# Patient Record
Sex: FEMALE | Race: OTHER | HISPANIC OR LATINO | Employment: UNEMPLOYED | URBAN - METROPOLITAN AREA
[De-identification: names, ages, dates, MRNs, and addresses within clinical notes are randomized per-mention and may not be internally consistent; named-entity substitution may affect disease eponyms.]

---

## 2017-02-24 ENCOUNTER — HISTORICAL (OUTPATIENT)
Dept: ADMINISTRATIVE | Facility: HOSPITAL | Age: 37
End: 2017-02-24

## 2017-03-03 ENCOUNTER — HISTORICAL (OUTPATIENT)
Dept: SURGERY | Facility: HOSPITAL | Age: 37
End: 2017-03-03

## 2021-09-09 ENCOUNTER — HISTORICAL (OUTPATIENT)
Dept: ADMINISTRATIVE | Facility: HOSPITAL | Age: 41
End: 2021-09-09

## 2022-04-30 NOTE — ED PROVIDER NOTES
"   Patient:   Luzmaria Hector            MRN: [id number]            FIN: [id number]               Age:   40 years     Sex:  Female     :  1980   Associated Diagnoses:   Pneumonia due to COVID-19 virus   Author:   Álvaro Patel MD      Basic Information   Time seen: Immediately upon arrival.   History source: Patient.   Arrival mode: Private vehicle.   History limitation: None.   Additional information: Chief Complaint from Nursing Triage Note : Chief Complaint   2021 11:17 CDT       Chief Complaint           covid (+) patient that reports to the ed (via ems) c/o body aches, sob, vomiting, and subjective fever (x)8 days. afebrile at this time. sp02=93% on room air. nadn...  .   Provider/Visit info:   Time Seen:  Álvaro Patel MD / 2021 11:22  .   History of Present Illness   The patient presents with multiple musculoskeletal pains and Recently Dx with COVID.  The onset was 1  weeks ago.  The course/duration of symptoms is constant and worsening.  Location: generalized. The character of symptoms is "pain".  The degree of pain is moderate.  The exacerbating factor is none.  The relieving factor is none.  Risk factors consist of COVID infection, unvaccinated.  Prior episodes: rare.  Therapy today: none.  Associated symptoms: shortness of breath, chills, denies chest pain, denies abdominal pain and denies vomiting.        Review of Systems   Constitutional symptoms:  Chills, weakness, fatigue, No fever,    Skin symptoms:  No rash, no petechiae.    Eye symptoms:  No recent vision problems,    ENMT symptoms:  Nasal congestion.   Respiratory symptoms:  Cough, no orthopnea, no hemoptysis.    Cardiovascular symptoms:  No chest pain, no palpitations, no syncope, no diaphoresis, no peripheral edema.    Gastrointestinal symptoms:  No abdominal pain, no nausea, no vomiting, no diarrhea, no constipation, no rectal bleeding.    Genitourinary symptoms:  No dysuria, no " hematuria, no vaginal bleeding, no vaginal discharge.    Musculoskeletal symptoms:  Muscle pain, no back pain, no Joint pain.    Neurologic symptoms:  Weakness, no headache, no dizziness, no altered level of consciousness, no numbness, no tingling.    Hematologic/Lymphatic symptoms:  Negative except as documented in HPI.             Additional review of systems information: All other systems reviewed and otherwise negative.      Health Status   Allergies:    Allergic Reactions (Selected)  No Known Allergies  No Known Medication Allergies,    Allergies (2) Active Reaction  No Known Allergies None Documented  No Known Medication Allergies None Documented  .   Medications:  (Selected)   Inpatient Medications  Ordered  Ancef 50 mg + dexamethasone sodium phosphate 4 mg: form: Injection, Intraocular, Once, Infuse over: 1 minute(s), first dose 17 8:00:00 CST, stop date 17 8:00:00 CST, [id number]  Prescriptions  Prescribed  dexamethasone 6 mg oral tablet: 6 mg = 1 tab(s), Oral, Daily, X 7 day(s), # 7 tab(s), 0 Refill(s), Pharmacy: [facility] #[id number].   Menstrual history: Per nurse's notes.      Past Medical/ Family/ Social History   Medical history:    Resolved  Pregnant ([id number]): Onset on 10/8/2015 at 35 years.  Resolved.  Pregnant ([id number]): Onset on 3/27/2009 at 28 years.  Resolved in  at 29 years.  Pregnant ([id number]): Onset on 3/27/2000 at 19 years.  Resolved in  at 20 years..   Surgical history:    Corneal Transplant (Left) on 3/3/2017 at 36 Years.  Comments:  3/3/2017 9:11 CST - Blaze SCHMITZ, Amanda QURESHI  auto-populated from documented surgical case  hyst.   section..   Family history:    Entire family history is negative..   Social history:    Social & Psychosocial Habits    Alcohol  2015 Risk Assessment: Denies Alcohol Use    2016  Use: Never    2016  Use: Never    Substance Use  2015 Risk Assessment: Denies Substance Abuse    10/08/2016  Use:  Never    12/26/2016  Use: Never    Tobacco  11/11/2015 Risk Assessment: Denies Tobacco Use    07/01/2016  Use: Never smoker    12/26/2016  Use: Never smoker    09/02/2021  Use: Never (less than 100 in l    Patient Wants Consult For Cessation Counseling N/A    09/09/2021  Use: Never (less than 100 in l    Patient Wants Consult For Cessation Counseling No    Abuse/Neglect  09/02/2021  [facility] Any signs of abuse or neglect No    Feels unsafe at home: No    Safe place to go: Yes    09/09/2021  [facility] Any signs of abuse or neglect No  , Alcohol use: Denies, Tobacco use: Denies, Drug use: Denies.   Problem list:    Active Problems (5)  2019-nCoV   Anemia   Body mass index 30+ - obesity   Fetal growth acceleration   Morbid obesity   .      Physical Examination               Vital Signs   Vital Signs   9/9/2021 11:17 CDT       Temperature Oral          36.8 DegC                             Temperature Oral (calculated)             98.24 DegF                             Peripheral Pulse Rate     99 bpm                             Respiratory Rate          24 br/min                             SpO2                      93 %  LOW                             Systolic Blood Pressure   99 mmHg                             Diastolic Blood Pressure  79 mmHg  .      Vital Signs (last 24 hrs)_____  Last Charted___________  Temp Oral     36.8 DegC  (SEP 09 11:17)  Heart Rate Peripheral   99 bpm  (SEP 09 11:17)  Resp Rate         24 br/min  (SEP 09 11:17)  SBP      99 mmHg  (SEP 09 11:17)  DBP      79 mmHg  (SEP 09 11:17)  SpO2      L 93%  (SEP 09 11:17)  Weight      119 kg  (SEP 09 11:17)  Height      172.72 cm  (SEP 09 11:17)  BMI      39.89  (SEP 09 11:17)  .   Measurements   9/9/2021 11:17 CDT       Weight Dosing             119 kg                             Weight Measured           119 kg                             Weight Measured and Calculated in Lbs     262.35 lb                             Height/Length Dosing      172.72 cm                              Height/Length Measured    172.72 cm                             Body Mass Index Measured  39.89 kg/m2  .   Basic Oxygen Information   9/9/2021 11:17 CDT       SpO2                      93 %  LOW  .   General:  Alert, mild distress.    Skin:  Warm, dry, pink, intact, no pallor, no rash.    Head:  Normocephalic, atraumatic.    Neck:  Supple, trachea midline, no JVD, no carotid bruit.    Eye:  Pupils are equal, round and reactive to light, normal conjunctiva.    Ears, nose, mouth and throat:  Oral mucosa moist, no pharyngeal erythema or exudate.    Cardiovascular:  Regular rate and rhythm, No murmur, Normal peripheral perfusion, No edema.    Respiratory:  Respirations are non-labored, breath sounds are equal, Symmetrical chest wall expansion, Breath sounds: Bilateral, rales present.    Gastrointestinal:  Soft, Nontender, Non distended, Normal bowel sounds, No organomegaly.    Back:  Nontender, Normal range of motion.    Musculoskeletal:  Normal ROM, normal strength, no swelling.    Neurological:  Alert and oriented to person, place, time, and situation, No focal neurological deficit observed, normal motor observed, normal speech observed, normal coordination observed.    Psychiatric:  Cooperative, appropriate mood & affect.       Medical Decision Making   Documents reviewed:  Emergency department nurses' notes.   Electrocardiogram:  Time 9/9/2021 14:11:00, rate 62, normal sinus rhythm, No ST-T changes, no ectopy, normal MA & QRS intervals, EP Interp.    Results review:  Lab results : Lab View   9/9/2021 11:59 CDT       Treatment                 2lpm nc                             pH Art                    7.52  HI                             pCO2 Art                  33.0 mmHg  LOW                             pO2 Art                   65.0 mmHg  LOW                             HCO3 Art                  26.9 mmol/L  HI                             O2 Sat Art                95.0 %                              D base                    3.9  HI                             CO Hgb                    1.9 %  NA    9/9/2021 11:52 CDT       COVID-19 Rapid            NEGATIVE    9/9/2021 11:48 CDT       Glucose Lvl               174 mg/dL  HI                             BUN                       12.5 mg/dL                             Creatinine                0.67 mg/dL                             Albumin Lvl               3.2 gm/dL  LOW                             BNP                       <10.0 pg/mL                             D-Dimer                   0.46 mcg/ml FEU                             WBC                       2.3 x10(3)/mcL  LOW                             Hgb                       8.1 gm/dL  LOW                             Hct                       29.1 %  LOW                             Platelet                  285 x10(3)/mcL                             MCV                       66.4 fL  LOW                             MCH                       18.5 pg  LOW                             MCHC                      27.8 gm/dL  LOW    ,    No qualifying data available.    Radiology results:  Reported at  9/9/2021 12:55:00, X-ray, reviewed radiologist's report, Rad Results (ST)  < 12 hrs   Accession: [id number]  Order: XR Chest 1 View  Report Dt/Tm: 09/09/2021 12:10  Report:   one view of the chest     CPT 59203     HISTORY:  Chest Pain     FINDINGS:  Examination reveals mediastinal and cardiac silhouettes to be within  normal limits. Confluent multifocal patchy opacities identified  throughout both lung fields more so at the periphery of the left lung  and left base this infiltrates might be related to infiltrates of  atypical pneumonia.     No other focal consolidative changes identified     IMPRESSION: Multifocal patchy opacities identified compatible with  infiltrates seen in patients with atypical pneumonia      .       Impression and Plan   Diagnosis   Pneumonia due to COVID-19 virus ([id number] U07.1)       Calls-Consults   -  9/9/2021 12:56:00 , Medicine, consult.    Plan   Condition: Stable.    Disposition: Admit time  9/9/2021 12:55:00, Admit to Inpatient Unit.    Counseled: Patient, Regarding diagnosis, Regarding diagnostic results, Regarding treatment plan, Patient indicated understanding of instructions.

## 2022-05-02 NOTE — HISTORICAL [facility]ERNER
This is a historical note converted from [employer]. Formatting and pictures may have been removed.  Please reference [employer] for original formatting and attached multimedia. Admit and Discharge Dates  Admit Date: 09/09/2021  Discharge Date: 09/12/2021  Physicians  Attending Physician - Arnoldo FRITZ, Alexus VILLEGAS  Admitting Physician - Arnoldo FRITZ, Alexus VILLEGAS  Primary Care Physician - No PCP, No  Discharge Diagnosis  1.?Pneumonia due to COVID-19 virus?U07.1  Surgical Procedures  No procedures recorded for this visit.  Immunizations  No immunizations recorded for this visit.  Admission Information  Ms. Cortez is a 40-year-old? female?with no significant?past medical history?who presented to the ED today with?complaints of 8 days?weakness/fatigue. ?Stated that her symptoms started?around 3 days prior to admission (9/6), associated with some subjective fevers,?chills, shortness of breath?and muscle aches.? She?was unaware of any?COVID-19?contacts?and is unvaccinated.? Denied any chest pain, palpitations, lightheadedness, dizziness, syncope, abdominal pain, bowel/bladder abnormalities, pedal edema/swelling.? She did endorse some?nausea and vomiting?for the past?1-2 days, nonbloody, nonbilious.? Denied any recent travel history.? Stayed at home with her daughters, states that?none of them are symptomatic?and were never diagnosed with Covid. She was tested positive for Covid on 9/2 at [facility] and was given dexamethasone 6 mg p.o. x7 days at that time.  ?  Hospital Course  During her stay, patient began to require supplemental oxygen via nasal canula at 3L. Patient was started on Dexamethasone 6 BID, Remdesivir, doxycycline, and ceftriaxone. Patient was then weaned down on her supplemental oxygen and was found to be statting well on room air after 48 hours. Patient was then ambulated and found to not require oxygen with exertion. ?Patient will be discharged with?Xarelto?10?for?28 days, a Medrol Dosepak?and Protonix for 30 days.?  Patient was instructed to follow-up with post wards clinic?in 1 to 2 weeks.  ?  During her stay, patient had?1+ blood culture?which resulted as staph epidermidis.? Patient at the time received?IV vancomycin?until?it was determined that this was?a contaminant.? Patients resulting?blood cultures depicted no growth.  ?  During her stay, patient was found to have?a normocytic anemia.? Patient was also found to be?iron deficient by iron studies.? Patient was given?IV iron repletion?one-time,?and will be discharged with?ferrous sulfate 325?every other day.  Significant Findings  Labs Last 24 Hours?  ?Chemistry? Hematology/Coagulation? Therapeutic Drug Levels?   Sodium Lvl: 139 mmol/L (09/12/21 06:44:55) WBC: 7.2 x10(3)/mcL (09/12/21 06:44:55) Vanco Tr:?12.8 ug/ml?Low (09/12/21 06:55:46)   Potassium Lvl: 4.2 mmol/L (09/12/21 06:44:55) RBC: 4.11 x10(6)/mcL (09/12/21 06:44:55)    Chloride:?110 mmol/L?High (09/12/21 06:44:55) Hgb:?7.7 gm/dL?Low (09/12/21 06:44:55)    CO2: 23 mmol/L (09/12/21 06:44:55) Hct:?28.6 %?Low (09/12/21 06:44:55)    Calcium Lvl: 9.1 mg/dL (09/12/21 06:44:55) Platelet:?406 x10(3)/mcL?High (09/12/21 06:44:55)    Magnesium Lvl: 2.3 mg/dL (09/12/21 06:44:55) MCV:?69.6 fL?Low (09/12/21 06:44:55)    Glucose Lvl:?135 mg/dL?High (09/12/21 06:44:55) MCH:?18.7 pg?Low (09/12/21 06:44:55)    BUN: 17.2 mg/dL (09/12/21 06:44:55) MCHC:?26.9 gm/dL?Low (09/12/21 06:44:55)    Creatinine: 0.58 mg/dL (09/12/21 06:44:55) RDW:?21.7 %?High (09/12/21 06:44:55)    Est Creat Clearance Ser: 131.37 mL/min (09/12/21 07:12:30) MPV: 10.1 fL (09/12/21 06:44:55)    eGFR-AA: >105 (09/12/21 06:44:55) Abs Neut: 4.84 x10(3)/mcL (09/12/21 06:44:55)    eGFR-PARRISH: >105 (09/12/21 06:44:55) Neutro Auto: 67 % (09/12/21 06:44:55)    Bili Total: 0.4 mg/dL (09/12/21 06:44:55) Lymph Auto: 25 % (09/12/21 06:44:55)    Bili Direct: 0.2 mg/dL (09/12/21 06:44:55) Mono Auto: 5 % (09/12/21 06:44:55)    Bili Indirect: 0.2 mg/dL (09/12/21 06:44:55) Abs  Neutro: 4.84 x10(3)/mcL (09/12/21 06:44:55)    AST:?39 unit/L?High (09/12/21 06:44:55) Abs Lymph: 1.8 x10(3)/mcL (09/12/21 06:44:55)    ALT:?56 unit/L?High (09/12/21 06:44:55) Abs Mono: 0.4 x10(3)/mcL (09/12/21 06:44:55)    Alk Phos: 59 unit/L (09/12/21 06:44:55) NRBC%: 1.0 (09/12/21 06:44:55)    Total Protein: 7.1 gm/dL (09/12/21 06:44:55) IG%: 3 % (09/12/21 06:44:55)    Albumin Lvl:?3 gm/dL?Low (09/12/21 06:44:55) IG#: 0.19 (09/12/21 06:44:55)    Globulin:?4.1 gm/dL?High (09/12/21 06:44:55)     A/G Ratio:?0.7 ratio?Low (09/12/21 06:44:55)     Phosphorus: 3.9 mg/dL (09/12/21 06:44:55)     CRP:?0.59 mg/dL?High (09/12/21 06:44:55)     Time Spent on discharge  35 minutes  Objective  Vitals & Measurements  T:?36.2? ?C (Oral)? TMIN:?36.2? ?C (Oral)? TMAX:?36.4? ?C (Oral)? HR:?62(Peripheral)? HR:?61(Monitored)? RR:?18? BP:?133/76? SpO2:?93%?  Physical Exam  General:?Patient laying comfortably in bed,?appears weak, in no acute distress  HEENT: Normocephalic, Atraumatic, grossly normal hearing  Neck: No JVD, no gross thyromegaly  Pulm: Course breath sounds bilaterally.? No rhonchi, no rales, no wheezes,?bilateral chest rise symmetric.  Cardio:?RRR, no gallops, no murmurs, no rubs.  Abdomen: obese, BS+, soft, non-distended, non-tender, no gross organomegaly  Extremity:? no edema. pulses 2+ in all 4 extremities  Neurologic: AAOx3. Responds to questions/commands appropriately.  MSK: No obvious deformities. Moving all extremities purposefully.  Skin: Warm, dry and without rashes.  Patient Discharge Condition  stable to home  Discharge Disposition  Ms.Sandra Diego Marte?is being discharged?home.  ?   Activity:?Return to normal activity?after quarantining?until 9/23. Recommend returning to?workin?11?days?with quarantine ending on 9/23.  Diet:?Regular Diet  ?  Medications:  -Xarelto 10?provided for DVT prophylaxis for 10 days  -Medrol Dosepak provided for?steroid taper?for 6 days  -Protonix provided for GI prophylaxis?for 30  days  -Ferrous sulfate 325?every other day provided for iron deficient anemia?for 30 days  -Continue other medications as previously prescribed  ?   Follow Ups:  -To be seen in [facility] with [facility]?2?by Dr. Friend in 1-2 weeks  -The following labs are to be drawn at the [facility] visit: CBC  ?  At this time, patient is determined to have maximized benefits of IP hospitalization. Patient is discharged in stable condition with OP f/u recommendations and instructions. All questions answered, and patient verbalized agreement with the POC. The patient was given return precautions prior to d/c including symptoms that should prompt return to ED or to call PCP. Total time spent of DC of 35 minutes.?  Faculty addendum:? Patient stable for discharge, assessed 9/12/21.? Agree with followup plan as documented.   Discharge Medication Reconciliation  Prescribed  ferrous sulfate (ferrous sulfate 325 mg oral tablet)?325 mg, Oral, Every other day  pantoprazole (pantoprazole 20 mg ORAL EC-Tablet)?20 mg, Oral, Daily  Continue  methylPREDNISolone (Medrol Dosepak 4 mg oral tablet)?1 packet(s), Oral, As Directed  methylPREDNISolone (Medrol Dosepak 4 mg oral tablet)?1 packet(s), Oral, As Directed  methylPREDNISolone (Medrol Dosepak 4 mg oral tablet)?1 packet(s), Oral, As Directed  methylPREDNISolone (Medrol Dosepak 4 mg oral tablet)?1 packet(s), Oral, As Directed  rivaroxaban (Xarelto 10mg Tablet)?10 mg, Oral, Daily  rivaroxaban (Xarelto 10mg Tablet)?10 mg, Oral, Daily  rivaroxaban (Xarelto 10mg Tablet)?10 mg, Oral, Daily  rivaroxaban (Xarelto 10mg Tablet)?10 mg, Oral, Daily  Discontinue  Misc Prescription (Oxygen concentrator and portable)?See Instructions  ferrous sulfate (ferrous sulfate 325 mg oral tablet)?325 mg, Oral, Every other day  pantoprazole (pantoprazole 20 mg ORAL EC-Tablet)?20 mg, Oral, Daily  Education and Orders Provided  [facility] (Custom[facility]  COVID Vaccine-[employer] Fact Sheet  (Custom)  COVID Vaccine-Moderna Fact Sheet (Custom)  Discharge - 09/12/21 13:17:00 CDT, Home?  Follow up  Report to Emergency Department if symptoms return or worsen  [facility], within 2 days, on  ????for post wards followup  [employer]  No Qualifying Data

## 2022-05-02 NOTE — HISTORICAL [facility] CERNER
This is a historical note converted from Arun. Formatting and pictures may have been removed.  Please reference Arun for original formatting and attached multimedia. Chief Complaint  covid (+) patient that reports to the ed (via ems) c/o body aches, sob, vomiting, and subjective fever (x)8 days. afebrile at this time. sp02=93% on room air. nadn...  History of Present Illness  Ms. Cortez is a 40-year-old? female?with no significant?past medical history?who presents to the ED today with?complaints of 8 days?weakness/fatigue. ?Stated that her symptoms started?around 3 days back, associated with some subjective fevers,?chills, shortness of breath?and muscle aches.? She is unaware of any?COVID-19?contacts?and is unvaccinated.? Denies any chest pain, palpitations, lightheadedness, dizziness, syncope, abdominal pain, bowel/bladder abnormalities, pedal edema/swelling.? She does endorse some?nausea and vomiting?for the past?1-2 days, nonbloody, nonbilious.? Denies any recent travel history.? Stays at home with her daughters, states that?none of them are symptomatic?and were never diagnosed with Covid. She was tested positive for Covid on  at [facility] and was given dexamethasone 6 mg p.o. x7 days  ?  PMH:?None  PSH:  section, left corneal transplant  Social history: Denies smoking, alcohol illicit drug use  Family history noncontributory  ?  In the ED,?patient was saturating?at 90-92% on room air. ?Labs significant for leukopenia, microcytic anemia. Chest x-ray showed multifocal patchy opacities consistent with atypical pneumonia. ?Admitted for COVID-19 pneumonitis.  Review of Systems  10-point ROS performed and negative except as above.  Physical Exam  Vitals & Measurements  T:?36.6? ?C (Oral)? TMIN:?36.6? ?C (Oral)? TMAX:?36.8? ?C (Oral)? HR:?58(Peripheral)? HR:?58(Monitored)? RR:?23? BP:?105/69? SpO2:?96%? WT:?119?kg? BMI:?39.89?  General: Alert. Responsive. Not in?acute distress.  HEENT:  Normocephalic, Atraumatic, No?conjunctival ?icterus  Neck: No JVD  Pulm: CTAB.?no exp?wheezes. no crackles. symmetrical chest expansion.  Cardio:?RRR. no appreciable murmurs/gallops.  Abdomen: obese, BS+, soft, non-distended, non-tender  Extremity:? no LE edema. good equal pulses felt bilaterally in all extremities.  Neurologic: AAOx3. Responds to questions/commands appropriately.  MSK: No obvious deformities. Moving all extremities purposefully.  Skin: Warm, dry and without rashes.  Assessment/Plan  1) COVID-19 pneumonitis  Date of symptom onset: 9/1/2021  Date Positive:9/2/2021  Hospital Day: 1  Oxygen Requirement: 3L NC  D-Dimer, Ferritin, CRP Trend: D-dimer and ferritin WNL. ?CRP-6.72  Renal Function:?WNL  Antibiotics Name/Dose/Day:?Rocephin, doxycycline-day 1  Steroids Dose/Day:?Dexamethasone 6 mg?daily  Remdesivir Y/N/Day:?Remdesivir-day 1  Anticoagulation:?Lovenox 40 mg  Nutrition:?Regular diet p.o.  Lines:?PIV  Complicating factors:?Obesity  ?   -Ordered blood cultures, respiratory cultures, respiratory PCR  -Started antibiotics with Rocephin?1 g q24hrs, doxycycline 100 mg bid, de-escalate?based on cultures  -Continue remdesivir for 5 days. ?D-dimer WNL,?can?continue anticoagulation with?Lovenox 40mg sq  -Monitor CMP, EKG daily while on remdesivir. ?Hold if bradycardic  -Continue dexamethasone 6 mg IV qd and will wean as tolerated  -Currently?requiring supplemental oxygen 3 L nasal cannula, will wean as tolerated  -Trending inflammatory markers every 48 hours  -Maintain strict isolation precautions. ?PT?consulted  ?  2) Leukopenia  3) Microcytic anemia  -WBC count of 2300, likely secondary to Covid infection, CTM  -Hb of 8.1, MCV of 66.4.? Iron profile shows low iron and borderline low ferritin. ?Microcytic anemia likely secondary to menorrhagia  -Administered one-time dose of Ferrlecit, can start p.o. supplementation?from tomorrow?or continue IV Ferrlecit?while inpatient  -We will need iron supplementation  on discharge  ?  DVT Px: Lovenox 40mg  GI Px:?Protonix 40 mg IV  Diet:?Regular  IVF:?None  Abx:?Rocephin, azithromycin-day 1  Lines:?PIV  Dispo: admitted to?telemetry with monitor, wean?oxygen, steroids. ?De-escalate antibiotics per cultures.?  Faculty addendum:? Reviewed history, physical findings, laboratory data, and CXR.? Admitted for hypoxemia secondary to Covid pneumonitis.? Laboratory data additionally revealed iron deficiency anemia.? Patient admits to menorrhagia.? Agree with current assessment and management plan.   Medications  Inpatient  acetaminophen, 650 mg= 2 tab(s), Oral, q6hr, PRN  acetaminophen, 1000 mg= 2 tab(s), Oral, q6hr, PRN  Ancef 50 mg + dexamethasone sodium phosphate 4 mg  dexamethasone, 6 mg= 1.5 mL, IV Push, Daily  doxycycline 100 mg intravenous injection  Lovenox, 40 mg= 0.4 mL, Subcutaneous, Daily  Protonix, 40 mg= 1 EA, IV Slow, Daily  Remdesivir (for IVPB)  Rocephin (for IVPB)  Zofran, 4 mg= 2 mL, IV Push, q4hr, PRN  Home  dexamethasone 6 mg oral tablet, 6 mg= 1 tab(s), Oral, Daily,? ?Investigating  Lab Results  Labs Last 24 Hours?  ?Chemistry? Hematology/Coagulation? Blood Gases?   Sodium Lvl: 138 mmol/L (21 11:48:00) D-Dimer: 0.46 mcg/ml FEU (21 11:48:00) Sample ABG: arterial (21 11:59:00)   Potassium Lvl: 3.5 mmol/L (21 11:48:00) WBC:?2.3 x10(3)/mcL?Low (21 11:48:00) Treatment: 2lpm nc (21 11:59:00)   Chloride: 104 mmol/L (21 11:48:00) RBC: 4.38 x10(6)/mcL (21 11:48:00) Site: Radial Rt (21 11:59:00)   CO2: 26 mmol/L (21 11:48:00) Hgb:?8.1 gm/dL?Low (21 11:48:00) pH Art:?7.52?High (21 11:59:00)   Calcium Lvl: 9.3 mg/dL (21 11:48:00) Hct:?29.1 %?Low (21 11:48:00) pCO2 Art:?33 mmHg?Low (21 11:59:00)   Glucose Lvl:?174 mg/dL?High (21 11:48:00) Platelet: 285 x10(3)/mcL (21 11:48:00) pO2 Art:?65 mmHg?Low (21 11:59:00)   EA.5 mg/dL (21 11:48:00) MCV:?66.4 fL?Low  (21 11:48:00) HCO3 Art:?26.9 mmol/L?High (21 11:59:00)   BUN: 12.5 mg/dL (21 11:48:00) MCH:?18.5 pg?Low (21 11:48:00) CO2 Totl Art:?27.9 mmol/L?High (21 11:59:00)   Creatinine: 0.67 mg/dL (21 11:48:00) MCHC:?27.8 gm/dL?Low (21 11:48:00) O2 Sat Art: 95 % (21 11:59:00)   Est Creat Clearance Ser: 113.72 mL/min (21 12:18:50) RDW:?20.5 %?High (21 11:48:00) D base:?3.9?High (21 11:59:00)   eGFR-AA: >105 (21 11:48:00) MPV: 10.3 fL (21 11:48:00) THB ABG:?8.1 gm/dL?Low (21 11:59:00)   eGFR-PARRISH: 104 mL/min/1.73 m2 (21 11:48:00) Abs Neut:?1.76 x10(3)/mcL?Low (21 11:48:00) CO Hgb: 1.9 % (21 11:59:00)   Bili Total: 0.8 mg/dL (21 11:48:00) Segs Man:?82 %?High (21 11:48:00) Met Hgb Art: 0.6 % (21 11:59:00)   Bili Direct: 0.3 mg/dL (21 11:48:00) Lymph Man:?16 %?Low (21 11:48:00) O2 Hgb:?92.6 %?Low (21 11:59:00)   Bili Indirect: 0.5 mg/dL (21 11:48:00) Monocyte Man: 2 % (21 11:48:00) Allens: Positive (21 11:59:00)   AST:?38 unit/L?High (21 11:48:00) Eos Man: 0 % (21 11:48:00) Setting 1 AB% fio2 (21 11:59:00)   ALT: 31 unit/L (21 11:48:00) Basophil Man: 0 % (21 11:48:00)    Alk Phos: 63 unit/L (21 11:48:00) Hypochrom: 1+ (21 11:48:00)    Total Protein: 8 gm/dL (21 11:48:00) Platelet Est: Adequate (21 11:48:00)    Albumin Lvl:?3.2 gm/dL?Low (21 11:48:00) Anisocyte: 1+ (21 11:48:00)    Globulin:?4.8 gm/dL?High (21 11:48:00) Poik: 1+ (21 11:48:00)    A/G Ratio:?0.7 ratio?Low (21 11:48:00) Macrocyte: 1+ (21 11:48:00)    BNP: <10.0 (21 11:48:00) Polychrom: 1+ (21 11:48:00)    Iron Lvl:?21 ug/dL?Low (21 11:48:00)     Transferrin: 274 mg/dL (21 11:48:00)     TIBC: 308 ug/dL (21 11:48:00)     Iron Sat:?7 %?Low (21 11:48:00)     Ferritin Lvl: 34.88 ng/mL  (09/09/21 11:48:00)     Hgb A1c: 5.2 % (09/09/21 11:48:00)     CRP:?6.72 mg/dL?High (09/09/21 11:48:00)     UIBC: 287 ug/dL (09/09/21 11:48:00)     TSH: 1.1451 uIU/mL (09/09/21 11:48:00)     Diagnostic Results  Accession:?[id number]  Order:?XR Chest 1 View  Report Dt/Tm:?09/09/2021 12:10  Report:?  one view of the chest  ?  CPT 02570  ?  HISTORY:  Chest Pain  ?  FINDINGS:  Examination reveals mediastinal and cardiac silhouettes to be within  normal limits. Confluent multifocal patchy opacities identified  throughout both lung fields more so at the periphery of the left lung  and left base this infiltrates might be related to infiltrates of  atypical pneumonia.  ?  No other focal consolidative changes identified  ?  IMPRESSION: Multifocal patchy opacities identified compatible with  infiltrates seen in patients with atypical pneumonia  ?

## 2024-03-28 ENCOUNTER — HOSPITAL ENCOUNTER (EMERGENCY)
Facility: HOSPITAL | Age: 44
Discharge: HOME | End: 2024-03-28
Attending: INTERNAL MEDICINE

## 2024-03-28 VITALS
HEART RATE: 91 BPM | SYSTOLIC BLOOD PRESSURE: 146 MMHG | WEIGHT: 238.13 LBS | TEMPERATURE: 98 F | OXYGEN SATURATION: 100 % | DIASTOLIC BLOOD PRESSURE: 89 MMHG | RESPIRATION RATE: 16 BRPM

## 2024-03-28 DIAGNOSIS — G44.209 TENSION HEADACHE: Primary | ICD-10-CM

## 2024-03-28 LAB
B-HCG UR QL: NEGATIVE
CTP QC/QA: YES

## 2024-03-28 PROCEDURE — 96372 THER/PROPH/DIAG INJ SC/IM: CPT | Performed by: PHYSICIAN ASSISTANT

## 2024-03-28 PROCEDURE — 81025 URINE PREGNANCY TEST: CPT | Performed by: PHYSICIAN ASSISTANT

## 2024-03-28 PROCEDURE — 25000003 PHARM REV CODE 250: Performed by: PHYSICIAN ASSISTANT

## 2024-03-28 PROCEDURE — 63600175 PHARM REV CODE 636 W HCPCS: Performed by: PHYSICIAN ASSISTANT

## 2024-03-28 PROCEDURE — 99284 EMERGENCY DEPT VISIT MOD MDM: CPT | Mod: 25

## 2024-03-28 RX ORDER — DICLOFENAC SODIUM 75 MG/1
75 TABLET, DELAYED RELEASE ORAL 2 TIMES DAILY PRN
Qty: 30 TABLET | Refills: 0 | Status: SHIPPED | OUTPATIENT
Start: 2024-03-28

## 2024-03-28 RX ORDER — PREDNISONE 10 MG/1
40 TABLET ORAL
Status: COMPLETED | OUTPATIENT
Start: 2024-03-28 | End: 2024-03-28

## 2024-03-28 RX ORDER — METHOCARBAMOL 500 MG/1
500 TABLET, FILM COATED ORAL 3 TIMES DAILY PRN
Qty: 15 TABLET | Refills: 0 | Status: SHIPPED | OUTPATIENT
Start: 2024-03-28 | End: 2024-04-02

## 2024-03-28 RX ORDER — ONDANSETRON 4 MG/1
4 TABLET, ORALLY DISINTEGRATING ORAL
Status: COMPLETED | OUTPATIENT
Start: 2024-03-28 | End: 2024-03-28

## 2024-03-28 RX ORDER — METHOCARBAMOL 100 MG/ML
500 INJECTION, SOLUTION INTRAMUSCULAR; INTRAVENOUS
Status: COMPLETED | OUTPATIENT
Start: 2024-03-28 | End: 2024-03-28

## 2024-03-28 RX ORDER — KETOROLAC TROMETHAMINE 30 MG/ML
30 INJECTION, SOLUTION INTRAMUSCULAR; INTRAVENOUS
Status: COMPLETED | OUTPATIENT
Start: 2024-03-28 | End: 2024-03-28

## 2024-03-28 RX ADMIN — ONDANSETRON 4 MG: 4 TABLET, ORALLY DISINTEGRATING ORAL at 11:03

## 2024-03-28 RX ADMIN — KETOROLAC TROMETHAMINE 30 MG: 30 INJECTION, SOLUTION INTRAMUSCULAR; INTRAVENOUS at 11:03

## 2024-03-28 RX ADMIN — PREDNISONE 40 MG: 10 TABLET ORAL at 12:03

## 2024-03-28 RX ADMIN — METHOCARBAMOL 500 MG: 100 INJECTION, SOLUTION INTRAMUSCULAR; INTRAVENOUS at 12:03

## 2024-03-28 NOTE — DISCHARGE INSTRUCTIONS
Take medications as prescribed as needed for pain with food and plenty of water.   Apply heating pad to sore muscles (being careful not to burn skin).  Soak in warm epsom salt baths for 20-30 minutes daily to help with sore and aching muscles.  Avoid lifting heavy.  Do neck stretches daily and get a professional neck and back massage to help loosen tense muscles that are likely causing headache.  Referral sent to Internal Medicine Clinic.  Call them to schedule an appointment.  Return immediately if symptoms worsen.

## 2024-03-28 NOTE — ED PROVIDER NOTES
Encounter Date: 3/28/2024       History     Chief Complaint   Patient presents with    Headache     CO HA W NAUSEA X 1 WK.  NO PCP.       43-year-old female with no significant past medical history, presents to the emergency department with complaints of headache, neck pain that radiates to her left shoulder and L arm and intermittent nausea x 1 week.  Patient states that her neck feels very tight.  She rates pain 9/10.  She denies light sensitivity, fever, chills, chest pain, shortness of breath, vision changes, double vision, dizziness, abdominal pain.      The history is provided by the patient. No  was used.     Review of patient's allergies indicates:  No Known Allergies  History reviewed. No pertinent past medical history.  History reviewed. No pertinent surgical history.  History reviewed. No pertinent family history.  Social History     Tobacco Use    Smoking status: Never    Smokeless tobacco: Never     Review of Systems   Constitutional:  Negative for chills and fever.   HENT:  Negative for congestion, sinus pressure and sore throat.    Eyes: Negative.    Respiratory:  Negative for cough, chest tightness and shortness of breath.    Cardiovascular:  Negative for chest pain and palpitations.   Gastrointestinal:  Positive for nausea. Negative for abdominal pain, diarrhea and vomiting.   Genitourinary:  Negative for decreased urine volume and dysuria.   Musculoskeletal:  Positive for neck pain (left,radiating into left shoulder and arm).   Skin:  Negative for rash and wound.   Neurological:  Positive for headaches. Negative for dizziness and weakness.       Physical Exam     Initial Vitals [03/28/24 1115]   BP Pulse Resp Temp SpO2   (!) 146/89 91 16 97.6 °F (36.4 °C) 100 %      MAP       --         Physical Exam    Nursing note and vitals reviewed.  Constitutional: She appears well-developed and well-nourished.   HENT:   Head: Normocephalic and atraumatic.   Nose: Nose normal.    Mouth/Throat: Oropharynx is clear and moist.   Eyes: Conjunctivae and EOM are normal. Pupils are equal, round, and reactive to light.   Neck: Neck supple.       Normal range of motion.  Cardiovascular:  Normal rate, regular rhythm, normal heart sounds and intact distal pulses.           Pulmonary/Chest: Breath sounds normal.   Abdominal: Abdomen is soft. Bowel sounds are normal. There is no abdominal tenderness. There is no rebound and no guarding.   Musculoskeletal:         General: Normal range of motion.      Cervical back: Normal range of motion and neck supple. No bony tenderness.      Thoracic back: No bony tenderness.      Lumbar back: No bony tenderness.        Back:      Neurological: She is alert and oriented to person, place, and time. No cranial nerve deficit. GCS score is 15. GCS eye subscore is 4. GCS verbal subscore is 5. GCS motor subscore is 6.   Skin: Skin is warm. Capillary refill takes less than 2 seconds.         ED Course   Procedures  Labs Reviewed   POCT URINE PREGNANCY          Imaging Results              X-Ray Cervical Spine 2 or 3 Views (Final result)  Result time 03/28/24 12:03:16      Final result by Shamir Castañeda MD (03/28/24 12:03:16)                   Impression:      No acute osseous finding or static listhesis    Mild cervical spondylosis.      Electronically signed by: Shamir Castañeda MD  Date:    03/28/2024  Time:    12:03               Narrative:    EXAMINATION:  Cervical spine four views    CLINICAL HISTORY:  Neck pain    COMPARISON:  None    FINDINGS:  There is no acute fracture or subluxation seen.  Odontoid view somewhat limited by overlap of structures.  On lateral view, there is active extravasation from the occiput to C6-7 level.  C7-T1 is partially obscured.  There is mild multilevel uncovertebral hypertrophy noted.                                       Medications   ketorolac injection 30 mg (30 mg Intramuscular Given 3/28/24 8433)   ondansetron disintegrating  tablet 4 mg (4 mg Oral Given 3/28/24 1131)   methocarbamoL injection 500 mg (500 mg Intramuscular Given 3/28/24 1217)   predniSONE tablet 40 mg (40 mg Oral Given 3/28/24 1217)     Medical Decision Making  43-year-old female with no significant past medical history, presents to the emergency department with complaints of headache, neck pain that radiates to her left shoulder and L arm and intermittent nausea x 1 week.  Patient states that her neck feels very tight.  She rates pain 9/10.  She denies light sensitivity, fever, chills, chest pain, shortness of breath, vision changes, double vision, dizziness, abdominal pain.      DDx:  Tension headache, migraine, muscle spasm of neck, cervical radiculopathy    X-ray cervical spine shows no acute osseous finding or static listhesis.  Mild cervical spondylosis.  Patient has tenderness and tight muscle on palpation of L trapezius & Paraspinous muscles.  Pain and headache likely due to tight muscles causing tension headache and radiculopathy.  IM Toradol, IM Robaxin and prednisone PO given in the ER.  Patient states her pain significantly decreased with medications.  Recommended she do neck stretches and get a back and neck massage to help relieve muscle spasms.  Gave strict ED precautions and referred her to Internal Medicine to establish a primary care doctor.  Prescribed Robaxin diclofenac.    Amount and/or Complexity of Data Reviewed  Labs: ordered.  Radiology: ordered. Decision-making details documented in ED Course.    Risk  Prescription drug management.               ED Course as of 03/28/24 1612   Thu Mar 28, 2024   1212 X-Ray Cervical Spine 2 or 3 Views  No acute osseous finding or static listhesis     Mild cervical spondylosis.   [ER]   1430 The patient is resting comfortably and in no acute distress.  She states that her symptoms have improved after treatment in Emergency Department. I personally discussed her test results and treatment plan.  Gave strict ED  precautions, discussed specific conditions for return to the emergency department and importance of follow up with her primary care provider.  Patient voices understanding and agrees to the plan discussed. All patients' questions have been answered at this time.   She has remained hemodynamically stable throughout entire stay in ED and is stable for discharge home.  [ER]      ED Course User Index  [ER] Claudia Torres PA                           Clinical Impression:  Final diagnoses:  [G44.209] Tension headache (Primary)          ED Disposition Condition    Discharge Stable          ED Prescriptions       Medication Sig Dispense Start Date End Date Auth. Provider    methocarbamoL (ROBAXIN) 500 MG Tab Take 1 tablet (500 mg total) by mouth 3 (three) times daily as needed (muscle spasm). For muscle spasm 15 tablet 3/28/2024 4/2/2024 Claudia Torres PA    diclofenac (VOLTAREN) 75 MG EC tablet Take 1 tablet (75 mg total) by mouth 2 (two) times daily as needed (pain). Do not take this with Advil, Ibuprofen, Motrin, Asprin, or Toradol. 30 tablet 3/28/2024 -- Claudia Torres PA          Follow-up Information       Follow up With Specialties Details Why Contact Info Additional Information    [facility] - Emergency Dept Emergency Medicine  As needed, If symptoms worsen [facility] [facility] [place] [address] [phone]     [facility] - Internal Medicine Internal Medicine Call  Ask For Internal Medicine clinic.  Once connected to clinic, inform them you were referred to clinic to establish care with a pcp. [address] [facility] [address] [phone] Internal Medicine Clinic Entrance #1             Claudia Torres PA  03/28/24 0761